# Patient Record
Sex: FEMALE | Race: WHITE | Employment: UNEMPLOYED | URBAN - NONMETROPOLITAN AREA
[De-identification: names, ages, dates, MRNs, and addresses within clinical notes are randomized per-mention and may not be internally consistent; named-entity substitution may affect disease eponyms.]

---

## 2020-01-16 ENCOUNTER — OFFICE VISIT (OUTPATIENT)
Dept: PRIMARY CARE CLINIC | Age: 30
End: 2020-01-16
Payer: COMMERCIAL

## 2020-01-16 ENCOUNTER — HOSPITAL ENCOUNTER (OUTPATIENT)
Age: 30
Discharge: HOME | End: 2020-01-16
Payer: COMMERCIAL

## 2020-01-16 VITALS
WEIGHT: 138 LBS | TEMPERATURE: 98.1 F | DIASTOLIC BLOOD PRESSURE: 78 MMHG | OXYGEN SATURATION: 100 % | SYSTOLIC BLOOD PRESSURE: 121 MMHG | HEART RATE: 84 BPM | RESPIRATION RATE: 16 BRPM

## 2020-01-16 LAB
BILIRUBIN, POC: NORMAL
BLOOD URINE, POC: NORMAL
CLARITY, POC: CLEAR
COLOR, POC: YELLOW
CONTROL: NORMAL
GLUCOSE URINE, POC: NORMAL
HAV IGM SER IA-ACNC: NONREACTIVE
HEPATITIS B CORE IGM ANTIBODY: NONREACTIVE
HEPATITIS B SURFACE ANTIGEN: NONREACTIVE
HEPATITIS C ANTIBODY: NONREACTIVE
HIV AG/AB: NONREACTIVE
KETONES, POC: NORMAL
LEUKOCYTE EST, POC: NORMAL
NITRITE, POC: NORMAL
PH, POC: 7.5
PREGNANCY TEST URINE, POC: NORMAL
PROTEIN, POC: NORMAL
SPECIFIC GRAVITY, POC: 1.01
T. PALLIDUM, IGG: NONREACTIVE
UROBILINOGEN, POC: 0.2

## 2020-01-16 PROCEDURE — 87389 HIV-1 AG W/HIV-1&-2 AB AG IA: CPT

## 2020-01-16 PROCEDURE — 87491 CHLMYD TRACH DNA AMP PROBE: CPT

## 2020-01-16 PROCEDURE — 86780 TREPONEMA PALLIDUM: CPT

## 2020-01-16 PROCEDURE — 86580 TB INTRADERMAL TEST: CPT | Performed by: NURSE PRACTITIONER

## 2020-01-16 PROCEDURE — 36415 COLL VENOUS BLD VENIPUNCTURE: CPT

## 2020-01-16 PROCEDURE — 81025 URINE PREGNANCY TEST: CPT | Performed by: NURSE PRACTITIONER

## 2020-01-16 PROCEDURE — 87591 N.GONORRHOEAE DNA AMP PROB: CPT

## 2020-01-16 PROCEDURE — 80074 ACUTE HEPATITIS PANEL: CPT

## 2020-01-16 PROCEDURE — 99213 OFFICE O/P EST LOW 20 MIN: CPT | Performed by: NURSE PRACTITIONER

## 2020-01-16 PROCEDURE — 81002 URINALYSIS NONAUTO W/O SCOPE: CPT | Performed by: NURSE PRACTITIONER

## 2020-01-16 ASSESSMENT — ENCOUNTER SYMPTOMS
CONSTIPATION: 0
ABDOMINAL PAIN: 0
COUGH: 0
SHORTNESS OF BREATH: 0
SORE THROAT: 0
NAUSEA: 0
DIARRHEA: 0
BACK PAIN: 1
WHEEZING: 0
VOMITING: 0

## 2020-01-16 NOTE — PROGRESS NOTES
[id number]  [address] [place] [address]  Dept: [phone]  Dept Fax: [phone]     Gudelia Ramirez is a 30 y.o. female who presents to [facility] today for hermedical conditions/complaints as noted below. Gudelia Ramirez is c/o of [facility] ([employer]      HPI:     Presents for [employer] [facility] for admission to program.       History reviewed. No pertinent past medical history. No current outpatient medications on file. No current facility-administered medications for this visit. No Known Allergies    Subjective:     Review of Systems   Constitutional: Negative for appetite change, chills, diaphoresis, fatigue and fever. HENT: Negative for congestion, ear pain and sore throat. Respiratory: Negative for cough, shortness of breath and wheezing. Cardiovascular: Negative for chest pain. Gastrointestinal: Negative for abdominal pain, constipation, diarrhea, nausea and vomiting. Genitourinary: Negative for dysuria, frequency and urgency. Musculoskeletal: Positive for back pain (from [facility] since 06/23/2018). Negative for gait problem. Skin: Negative for rash and wound. Neurological: Negative for dizziness, light-headedness and headaches. Objective:      Physical Exam  Vitals signs and nursing note reviewed. Constitutional:       General: She is not in acute distress. Appearance: Normal appearance. She is well-developed. She is not ill-appearing or diaphoretic. HENT:      Head: Normocephalic and atraumatic. Right Ear: Hearing, tympanic membrane, ear canal and external ear normal.      Left Ear: Hearing, tympanic membrane, ear canal and external ear normal.      Nose: Nose normal.      Right Sinus: No maxillary sinus tenderness or frontal sinus tenderness. Left Sinus: No maxillary sinus tenderness or frontal sinus tenderness. Mouth/Throat:      Lips: Pink.       Mouth: Mucous membranes are moist.      Pharynx: Oropharynx is clear. Uvula midline. No oropharyngeal exudate or posterior oropharyngeal erythema. Tonsils: No tonsillar abscesses. Eyes:      Conjunctiva/sclera: Conjunctivae normal.      Pupils: Pupils are equal, round, and reactive to light. Cardiovascular:      Rate and Rhythm: Normal rate and regular rhythm. Heart sounds: Normal heart sounds, S1 normal and S2 normal. No murmur. No friction rub. No gallop. Pulmonary:      Effort: Pulmonary effort is normal. No accessory muscle usage or respiratory distress. Breath sounds: Normal breath sounds and air entry. No decreased breath sounds, wheezing, rhonchi or rales. Abdominal:      General: Abdomen is flat. Bowel sounds are normal.      Palpations: Abdomen is soft. Tenderness: There is no tenderness. Musculoskeletal: Normal range of motion. Lymphadenopathy:      Cervical: No cervical adenopathy. Right cervical: No superficial or posterior cervical adenopathy. Left cervical: No superficial or posterior cervical adenopathy. Skin:     General: Skin is warm and dry. Coloration: Skin is not pale. Findings: No erythema or rash. Neurological:      Mental Status: She is alert and oriented to person, place, and time. Psychiatric:         Behavior: Behavior normal. Behavior is cooperative.        /78   Pulse 84   Temp 98.1 °F (36.7 °C)   Resp 16   Wt 138 lb (62.6 kg)   LMP 12/16/2019   SpO2 100%     Results for orders placed or performed in visit on 01/16/20   POCT Urinalysis no Micro   Result Value Ref Range    Color, UA YELLOW     Clarity, UA CLEAR     Glucose, UA POC NEG     Bilirubin, UA NEG     Ketones, UA NEG     Spec Grav, UA 1.015     Blood, UA POC NEG     pH, UA 7.5     Protein, UA POC NEG     Urobilinogen, UA 0.2     Leukocytes, UA TR     Nitrite, UA NEG    POCT urine pregnancy   Result Value Ref Range    Preg Test, Ur NEG     Control POS      Assessment:      Diagnosis Orders 1. Physical exam, routine  HIV Screen    Hepatitis Panel, Acute    T. pallidum Ab    C.trachomatis N.gonorrhoeae DNA, Urine   2. Substance use disorder     3. Dysuria  POCT Urinalysis no Micro    POCT urine pregnancy       Plan:      Return if symptoms worsen or fail to improve. No orders of the defined types were placed in this encounter. · [employer] physical completed. · HIV, T. Pallidum Ab.and Hepatitis Panel orders given to patient, will fax results to [employer]. · Urine for GC and Chlamydia sent to lab - will fax results to [employer]. · Patient information given for Wellness. · Return to have TST reading completed in 48 to 72 hours. Dori Thornton received counseling on the following healthy behaviors: medication adherence. Patient given educational materials - see patient instructions. Discussed use,benefit, and side effects of prescribed medications. Treatment plan discussed at visit. Continue routine health care follow up. All patient questions answered. Pt voiced understanding.       Electronically signed by REBEKAH Carrasco CNP on 1/16/2020 at 6:38 PM

## 2020-01-18 ENCOUNTER — OFFICE VISIT (OUTPATIENT)
Dept: PRIMARY CARE CLINIC | Age: 30
End: 2020-01-18

## 2020-01-18 LAB
INDURATION: 0
TB SKIN TEST: NORMAL

## 2020-01-20 LAB
C. TRACHOMATIS DNA ,URINE: NEGATIVE
N. GONORRHOEAE DNA, URINE: NEGATIVE
SPECIMEN DESCRIPTION: NORMAL